# Patient Record
Sex: MALE | Race: WHITE | Employment: OTHER | URBAN - METROPOLITAN AREA
[De-identification: names, ages, dates, MRNs, and addresses within clinical notes are randomized per-mention and may not be internally consistent; named-entity substitution may affect disease eponyms.]

---

## 2019-04-11 ENCOUNTER — HOSPITAL ENCOUNTER (OUTPATIENT)
Age: 24
Discharge: ANOTHER HOSPITAL | End: 2019-04-11
Attending: FAMILY MEDICINE
Payer: COMMERCIAL

## 2019-04-11 ENCOUNTER — APPOINTMENT (OUTPATIENT)
Dept: GENERAL RADIOLOGY | Age: 24
End: 2019-04-11
Attending: FAMILY MEDICINE
Payer: COMMERCIAL

## 2019-04-11 VITALS
DIASTOLIC BLOOD PRESSURE: 75 MMHG | HEART RATE: 95 BPM | WEIGHT: 135 LBS | BODY MASS INDEX: 18.9 KG/M2 | HEIGHT: 71 IN | SYSTOLIC BLOOD PRESSURE: 122 MMHG | RESPIRATION RATE: 21 BRPM | TEMPERATURE: 101 F | OXYGEN SATURATION: 98 %

## 2019-04-11 DIAGNOSIS — J18.9 COMMUNITY ACQUIRED PNEUMONIA OF LEFT LOWER LOBE OF LUNG: ICD-10-CM

## 2019-04-11 DIAGNOSIS — J02.0 STREPTOCOCCAL SORE THROAT: Primary | ICD-10-CM

## 2019-04-11 PROCEDURE — 80047 BASIC METABLC PNL IONIZED CA: CPT

## 2019-04-11 PROCEDURE — 85025 COMPLETE CBC W/AUTO DIFF WBC: CPT | Performed by: FAMILY MEDICINE

## 2019-04-11 PROCEDURE — 71046 X-RAY EXAM CHEST 2 VIEWS: CPT | Performed by: FAMILY MEDICINE

## 2019-04-11 PROCEDURE — 96361 HYDRATE IV INFUSION ADD-ON: CPT

## 2019-04-11 PROCEDURE — 99204 OFFICE O/P NEW MOD 45 MIN: CPT

## 2019-04-11 PROCEDURE — 96374 THER/PROPH/DIAG INJ IV PUSH: CPT

## 2019-04-11 PROCEDURE — 87430 STREP A AG IA: CPT

## 2019-04-11 PROCEDURE — 87502 INFLUENZA DNA AMP PROBE: CPT | Performed by: FAMILY MEDICINE

## 2019-04-11 RX ORDER — ACETAMINOPHEN 500 MG
1000 TABLET ORAL ONCE
Status: COMPLETED | OUTPATIENT
Start: 2019-04-11 | End: 2019-04-11

## 2019-04-11 RX ORDER — SODIUM CHLORIDE 9 MG/ML
1000 INJECTION, SOLUTION INTRAVENOUS ONCE
Status: COMPLETED | OUTPATIENT
Start: 2019-04-11 | End: 2019-04-11

## 2019-04-11 RX ORDER — AZITHROMYCIN 250 MG/1
TABLET, FILM COATED ORAL
Qty: 1 PACKAGE | Refills: 0 | Status: SHIPPED | OUTPATIENT
Start: 2019-04-11 | End: 2019-04-11

## 2019-04-11 RX ORDER — ONDANSETRON 2 MG/ML
4 INJECTION INTRAMUSCULAR; INTRAVENOUS ONCE
Status: COMPLETED | OUTPATIENT
Start: 2019-04-11 | End: 2019-04-11

## 2019-04-11 RX ORDER — AMOXICILLIN AND CLAVULANATE POTASSIUM 875; 125 MG/1; MG/1
1 TABLET, FILM COATED ORAL 2 TIMES DAILY
Qty: 20 TABLET | Refills: 0 | Status: SHIPPED | OUTPATIENT
Start: 2019-04-11 | End: 2019-04-11

## 2019-04-11 NOTE — ED PROVIDER NOTES
Patient Seen in: [address]    History   Patient presents with:  Fever (infectious)  Cough/URI    Stated Complaint: Cold sympoms/ flu symptoms    HPI  21yo M presents to IC with nasal congestion and cough for about 3 wee Constitutional: He is oriented to person, place, and time. No distress.    Nontoxic, comfortable, engaging   HENT:   Right Ear: Tympanic membrane, external ear and ear canal normal.   Left Ear: Tympanic membrane, external ear and ear canal normal.   Nose: Narrative:    PROCEDURE: XR CHEST PA + LAT CHEST (CPT=71020)  COMPARISON: None. INDICATIONS: Cough with chest pain and difficulty taking deep breaths for 2 days.  Cold symptoms for 3 weeks.  Occasional smoker.   TECHNIQUE:   Two views.    FINDINGS:   Edgardo Osorio pm    Follow-up:  No follow-up provider specified. Medications Prescribed:  There are no discharge medications for this patient.

## 2019-04-11 NOTE — ED INITIAL ASSESSMENT (HPI)
Pt states for 3 weeks believed he had a cold, pt states having a lot of phlem and mucus. Pt states approx. 3 days ago pt states having a fever and body aches.  Pt states having a cough pt states now having pain with coughing and difficulty taking a full macho

## 2019-04-11 NOTE — ED NOTES
Pt to be reassessed in [address] [facility] ED, per MD recommendation for strep/pneumonia and abnormal cbc. Pt confirmed understanding and would be transferring self. Pt leaving IC stable and ambulatory.